# Patient Record
Sex: FEMALE | Race: WHITE | NOT HISPANIC OR LATINO | Employment: OTHER | URBAN - METROPOLITAN AREA
[De-identification: names, ages, dates, MRNs, and addresses within clinical notes are randomized per-mention and may not be internally consistent; named-entity substitution may affect disease eponyms.]

---

## 2017-09-06 ENCOUNTER — OFFICE VISIT (OUTPATIENT)
Dept: RHEUMATOLOGY | Facility: CLINIC | Age: 62
End: 2017-09-06
Payer: COMMERCIAL

## 2017-09-06 VITALS
BODY MASS INDEX: 26.54 KG/M2 | SYSTOLIC BLOOD PRESSURE: 152 MMHG | DIASTOLIC BLOOD PRESSURE: 88 MMHG | WEIGHT: 123 LBS | HEIGHT: 57 IN

## 2017-09-06 DIAGNOSIS — M79.7 FIBROMYALGIA: ICD-10-CM

## 2017-09-06 DIAGNOSIS — M19.042 OSTEOARTHRITIS OF BOTH HANDS, UNSPECIFIED OSTEOARTHRITIS TYPE: ICD-10-CM

## 2017-09-06 DIAGNOSIS — M19.041 OSTEOARTHRITIS OF BOTH HANDS, UNSPECIFIED OSTEOARTHRITIS TYPE: ICD-10-CM

## 2017-09-06 DIAGNOSIS — G25.0 ESSENTIAL TREMOR: ICD-10-CM

## 2017-09-06 DIAGNOSIS — G47.00 INSOMNIA, UNSPECIFIED TYPE: Primary | ICD-10-CM

## 2017-09-06 DIAGNOSIS — G43.909 MIGRAINE WITHOUT STATUS MIGRAINOSUS, NOT INTRACTABLE, UNSPECIFIED MIGRAINE TYPE: ICD-10-CM

## 2017-09-06 PROCEDURE — 3008F BODY MASS INDEX DOCD: CPT | Mod: ,,, | Performed by: INTERNAL MEDICINE

## 2017-09-06 PROCEDURE — 99214 OFFICE O/P EST MOD 30 MIN: CPT | Mod: ,,, | Performed by: INTERNAL MEDICINE

## 2017-09-06 RX ORDER — DULOXETIN HYDROCHLORIDE 30 MG/1
CAPSULE, DELAYED RELEASE ORAL
COMMUNITY
End: 2022-12-29 | Stop reason: CLARIF

## 2017-09-06 RX ORDER — TEMAZEPAM 30 MG/1
CAPSULE ORAL
COMMUNITY
End: 2017-09-06 | Stop reason: SDUPTHER

## 2017-09-06 RX ORDER — HYDROGEN PEROXIDE 3 %
20 SOLUTION, NON-ORAL MISCELLANEOUS DAILY
COMMUNITY

## 2017-09-06 RX ORDER — ALPRAZOLAM 0.25 MG/1
TABLET ORAL
COMMUNITY
End: 2017-09-06 | Stop reason: ALTCHOICE

## 2017-09-06 RX ORDER — TRAMADOL HYDROCHLORIDE 50 MG/1
50 TABLET ORAL EVERY 6 HOURS PRN
COMMUNITY

## 2017-09-06 RX ORDER — TEMAZEPAM 30 MG/1
30 CAPSULE ORAL NIGHTLY PRN
Qty: 30 CAPSULE | Refills: 5 | Status: SHIPPED | OUTPATIENT
Start: 2017-09-06

## 2017-09-06 RX ORDER — ESTRADIOL 0.1 MG/G
CREAM VAGINAL
COMMUNITY
End: 2022-12-29 | Stop reason: CLARIF

## 2017-09-06 RX ORDER — METHOCARBAMOL 750 MG/1
TABLET, FILM COATED ORAL
COMMUNITY
End: 2022-12-29 | Stop reason: CLARIF

## 2017-09-06 NOTE — PATIENT INSTRUCTIONS
Fibromyalgia  Fibromyalgia is a chronic condition. It causes pain and tenderness in connective tissues. This causes muscle pain. Often, there are also many tender areas throughout the body. Symptoms may also include stiffness and feelings of numbness and tingling. Symptoms may be worse upon waking up. They may increase with poor sleep, heavy activity, cold or damp weather, anxiety, or stress.  People with fibromyalgia often feel tired. They may have trouble sleeping. Other symptoms include morning stiffness, headaches, and painful menstrual periods. Some people have problems with thinking clearly and changes in memory.  The cause of fibromyalgia is not known. Symptoms are similar to that of other diseases. These include rheumatoid arthritis, low thyroid, chronic fatigue syndrome, and Lyme disease. In some cases, these diseases may occur together.  Fibromyalgia is often treated with medicines. You and your healthcare provider can discuss the medication plan that may work best for you. You may have to try more than one medicine or combination of medicines before you find what works for you.  Home care  · If your healthcare provider has prescribed or recommended medicines, take them as directed.  · Rest as needed. Try to get enough sleep. If you have trouble sleeping, discuss this with your healthcare provider.   · Be active. Regular exercise can help manage symptoms. Some options include walking, swimming, and biking. Strengthening exercises may also be helpful. Start an exercise program gradually. Talk to your healthcare provider about the best ways to be active.  · Follow a healthy diet. Limit caffeine and alcohol. If you smoke, ask your healthcare provider for help to stop.  · Notice how your body reacts to stress. Learn to listen to your body signals. This will help you take action before the stress becomes severe.  · Learn relaxation techniques. Also consider joining a stress reduction program or class.  · Talk  to your healthcare provider about trying complementary treatments. These include acupuncture, hypnosis, and biofeedback. Yoga and abundio chi may be helpful.  · Ask your healthcare provider about cognitive behavioral therapy (CBT). This type of counseling can help people with fibromyalgia cope better with their illness.  Follow-up care  Follow up with your healthcare provider or as advised by our staff. In many cases, fibromyalgia is best treated with a team approach.  This may involve your primary care provider, a rheumatologist, a physical therapist, and a mental health professional.   For more information: [employer] [place] [employer] (NIAMS)  www.niams.nih.gov 831-711-1877  When to seek medical advice  Contact your healthcare provider if any of the following occurs:  · Symptoms getting worse or new symptoms developing  · You feel hopeless, helpless, or lose interest in day-to-day life  Date Last Reviewed: 4/26/2015  © 8441-7222 [employer]. [address] [place] [address], [place] [address]. All rights reserved. This information is not intended as a substitute for professional medical care. Always follow your healthcare professional's instructions.

## 2017-09-06 NOTE — PROGRESS NOTES
[facility] RHEUMATOLOGY           Follow-up visit      Subjective:       Patient ID:   NAME: Shirley Ortiz : 1955     61 y.o. female    Referring Doc: No ref. provider found  Other Physicians:    Chief Complaint:  Osteoarthritis      HPI/Interval History:   The patient continues to have generalized musculoskeletal pain. She felt that Cymbalta was helpful initially but is much less helpful at this time. She does benefit from Restoril and states she sleeps well when she takes it. She feels her arthritis is resting and is interfering with her ability to continue working 4 days weekly. She has seen neurology and has been diagnosed with essential tremor and migraine headaches.She did not tolerate any of the medications given to her by Dr. Castellanos. She has followed up with Dr. Jacobs and has received additional cortisone shots in the hands.          ROS:   GEN: no fever, night sweats or weight loss  SKIN:  no rashes , erythema, bruising, or swelling, no Raynauds, no photosensitivity  HEENT: + HAs, no changes in vision, no mouth ulcers, no sicca symptoms, no scalp tenderness, jaw claudication.  CV: no CP, SOB, PND, GUARDADO or orthopnea,no palpitations  PULM: no SOB, cough, hemoptysis, sputum or pleuritic pain  GI: no abdominal pain, nausea, vomiting, constipation, diarrhea, melanotic stools, BRBPR, or hematemesis.no dysphagia  : no hematuria, dysuria  NEURO:no paresthesias, headaches, visual disturbances, muscle weakness  MUSCULOSKELETAL:  Aching and pain as mentioned above  PSYCH: No insomnia, no significant depression, no anxiety    Medications:    Current Outpatient Prescriptions:     alprazolam (XANAX) 0.25 MG tablet, Take by mouth., Disp: , Rfl:     CALCIUM CARBONATE (CALCIUM 300 ORAL), Take by mouth., Disp: , Rfl:     duloxetine (CYMBALTA) 30 MG capsule, Take by mouth., Disp: , Rfl:     esomeprazole (NEXIUM) 20 MG capsule, Take by mouth., Disp: , Rfl:     estradiol (ESTRACE) 0.01 % (0.1 mg/gram) vaginal  "cream, Place vaginally., Disp: , Rfl:     methocarbamol (ROBAXIN) 750 MG Tab, Take by mouth., Disp: , Rfl:     MULTIVITAMIN (MULTIPLE VITAMIN ORAL), Take by mouth., Disp: , Rfl:     OMEGA-3 FATTY ACIDS (FISH OIL CONCENTRATE ORAL), Take by mouth., Disp: , Rfl:     temazepam (RESTORIL) 30 mg capsule, Take by mouth., Disp: , Rfl:     TOPIRAMATE (TROKENDI XR ORAL), Take 75 mg by mouth once daily., Disp: , Rfl:     tramadol (ULTRAM) 50 mg tablet, Take by mouth., Disp: , Rfl:   FAMILY HISTORY: negative for Connective Tissue Disease        Review of patient's allergies indicates:   Allergen Reactions    Codeine              Objective:     Vitals:  Blood pressure (!) 152/88, height 4' 9" (1.448 m), weight 55.8 kg (123 lb).    Physical Examination:   GEN: wn/wd female in no apparent distress; AAOx3  SKIN: no rashes, no lesions, no sclerodactyly, no Raynaud's, no periungual erythema  HEAD: no alopecia, no scalp tenderness, no temporal artery tenderness or induration.  EYES: no pallor, no icterus, PERRLA  ENT:  no thrush, no mucosal dryness or ulcerations, adequate oral hygiene & dentition.  NECK: supple x 6, no masses, no thyromegaly, no lymphadenopathy.  CV:   S1 and S2 regular, no murmurs, gallop or rubs  CHEST: Normal respiratory effort;  normal breath sounds/no adventitious sounds. No signs of consolidation.  ABD: non-tender and non-distended; soft; normal bowel sounds; no rebound/guarding or tenderness. No hepatosplenomegaly.  Musculoskeletal:   No changes are noted from the prior visit. There is no active synovitis or chronic synovial proliferation. There are however curious swan-neck deformities of the third and fourth digits of the right hand.  EXTREM: no clubbing, cyanosis or edema. normal pulses.  NEURO: grossly intact; motor/sensory WNL; AAOx3; no tremors  PSYCH: normal mood, affect and behavior            Labs:   No results found for: WBC, HGB, HCT, MCV, PLTCMP@  No results found for: NA, K, CL, CO2, GLU, " BUN, CREATININE, CALCIUM, PROT, ALBUMIN, BILITOT, ALKPHOS, AST, ALT, CPK, CRP, MOSHE, RF, URICACID      Radiology/Diagnostic Studies:    No x-rays are available to me    Assessment/Discussion/Plan:   61 y.o. female with osteoarthritis and fibromyalgia        PLAN:  As the patient wishes to stop Cymbalta, I have instructed her to take 30 mg every other day for 2 weeks and then to discontinue the medication on the first Monday or Tuesday thereafter. She understands the specificity of this instruction. Her Restoril was renewed. No further blood testing is required.  She will notify me by telephone after she has completed the weaning from Cymbalta to indicate whether she wishes to remain off the medication or whether she wishes to restart it. If it is restarted, I will push her toward 60 mg.    RTC:  I will see her back in approximately 4 months.      Electronically signed by Bar Hall MD

## 2021-02-24 ENCOUNTER — IMMUNIZATION (OUTPATIENT)
Dept: FAMILY MEDICINE | Facility: CLINIC | Age: 66
End: 2021-02-24
Payer: OTHER GOVERNMENT

## 2021-02-24 DIAGNOSIS — Z23 NEED FOR VACCINATION: Primary | ICD-10-CM

## 2021-02-24 PROCEDURE — 91300 COVID-19, MRNA, LNP-S, PF, 30 MCG/0.3 ML DOSE VACCINE: CPT | Mod: PBBFAC | Performed by: INTERNAL MEDICINE

## 2021-03-17 ENCOUNTER — IMMUNIZATION (OUTPATIENT)
Dept: FAMILY MEDICINE | Facility: CLINIC | Age: 66
End: 2021-03-17
Payer: OTHER GOVERNMENT

## 2021-03-17 DIAGNOSIS — Z23 NEED FOR VACCINATION: Primary | ICD-10-CM

## 2021-03-17 PROCEDURE — 91300 COVID-19, MRNA, LNP-S, PF, 30 MCG/0.3 ML DOSE VACCINE: ICD-10-PCS | Mod: ,,, | Performed by: INTERNAL MEDICINE

## 2021-03-17 PROCEDURE — 91300 COVID-19, MRNA, LNP-S, PF, 30 MCG/0.3 ML DOSE VACCINE: CPT | Mod: ,,, | Performed by: INTERNAL MEDICINE

## 2021-03-17 PROCEDURE — 0002A COVID-19, MRNA, LNP-S, PF, 30 MCG/0.3 ML DOSE VACCINE: CPT | Mod: CV19,,, | Performed by: INTERNAL MEDICINE

## 2021-03-17 PROCEDURE — 0002A COVID-19, MRNA, LNP-S, PF, 30 MCG/0.3 ML DOSE VACCINE: ICD-10-PCS | Mod: CV19,,, | Performed by: INTERNAL MEDICINE

## 2023-01-04 PROBLEM — K82.8 BILIARY DYSKINESIA: Status: ACTIVE | Noted: 2023-01-04

## 2023-01-04 PROBLEM — K80.20 CHOLELITHIASIS: Status: ACTIVE | Noted: 2023-01-04
